# Patient Record
Sex: FEMALE | Race: WHITE | NOT HISPANIC OR LATINO | Employment: UNEMPLOYED | URBAN - NONMETROPOLITAN AREA
[De-identification: names, ages, dates, MRNs, and addresses within clinical notes are randomized per-mention and may not be internally consistent; named-entity substitution may affect disease eponyms.]

---

## 2020-06-09 ENCOUNTER — HISTORICAL (OUTPATIENT)
Dept: ADMINISTRATIVE | Facility: HOSPITAL | Age: 60
End: 2020-06-09

## 2021-08-19 DIAGNOSIS — Z12.31 SCREENING MAMMOGRAM, ENCOUNTER FOR: ICD-10-CM

## 2021-08-19 DIAGNOSIS — M81.0 OSTEOPOROSIS: Primary | ICD-10-CM

## 2021-11-22 ENCOUNTER — HOSPITAL ENCOUNTER (OUTPATIENT)
Dept: RADIOLOGY | Facility: HOSPITAL | Age: 61
Discharge: HOME | End: 2021-11-22
Attending: FAMILY MEDICINE
Payer: COMMERCIAL

## 2021-11-22 VITALS — BODY MASS INDEX: 22.09 KG/M2 | WEIGHT: 117 LBS | HEIGHT: 61 IN

## 2021-11-22 DIAGNOSIS — Z12.31 SCREENING MAMMOGRAM, ENCOUNTER FOR: ICD-10-CM

## 2021-11-22 DIAGNOSIS — M81.0 OSTEOPOROSIS: ICD-10-CM

## 2021-11-22 PROCEDURE — 77080 DXA BONE DENSITY AXIAL: CPT | Mod: TC

## 2021-11-22 PROCEDURE — 77067 SCR MAMMO BI INCL CAD: CPT | Mod: TC

## 2022-11-23 ENCOUNTER — HOSPITAL ENCOUNTER (OUTPATIENT)
Dept: RADIOLOGY | Facility: HOSPITAL | Age: 62
Discharge: HOME | End: 2022-11-23
Attending: NURSE PRACTITIONER
Payer: COMMERCIAL

## 2022-11-23 VITALS — BODY MASS INDEX: 22.09 KG/M2 | HEIGHT: 61 IN | WEIGHT: 117 LBS

## 2022-11-23 DIAGNOSIS — Z12.31 SCREENING MAMMOGRAM FOR BREAST CANCER: ICD-10-CM

## 2022-11-23 PROCEDURE — 77063 BREAST TOMOSYNTHESIS BI: CPT | Mod: TC

## 2022-12-29 ENCOUNTER — HOSPITAL ENCOUNTER (OUTPATIENT)
Dept: PREADMISSION TESTING | Facility: HOSPITAL | Age: 62
Discharge: HOME | End: 2022-12-29
Attending: SURGERY
Payer: COMMERCIAL

## 2022-12-29 ENCOUNTER — HOSPITAL ENCOUNTER (OUTPATIENT)
Dept: PULMONOLOGY | Facility: HOSPITAL | Age: 62
Discharge: HOME | End: 2022-12-29
Attending: SURGERY
Payer: COMMERCIAL

## 2022-12-29 ENCOUNTER — ANESTHESIA EVENT (OUTPATIENT)
Dept: ENDOSCOPY | Facility: HOSPITAL | Age: 62
End: 2022-12-29
Payer: COMMERCIAL

## 2022-12-29 VITALS — BODY MASS INDEX: 21.52 KG/M2 | HEIGHT: 61 IN | WEIGHT: 114 LBS

## 2022-12-29 DIAGNOSIS — Z12.11 SPECIAL SCREENING FOR MALIGNANT NEOPLASMS, COLON: ICD-10-CM

## 2022-12-29 DIAGNOSIS — Z12.11 SPECIAL SCREENING FOR MALIGNANT NEOPLASMS, COLON: Primary | ICD-10-CM

## 2022-12-29 RX ORDER — AMLODIPINE BESYLATE 5 MG/1
5 TABLET ORAL
COMMUNITY
Start: 2022-10-02

## 2022-12-29 RX ORDER — HYDROCHLOROTHIAZIDE 25 MG/1
TABLET ORAL
COMMUNITY
Start: 2022-10-30

## 2022-12-29 RX ORDER — SODIUM CHLORIDE 0.9 % (FLUSH) 0.9 %
10 SYRINGE (ML) INJECTION
Status: CANCELLED | OUTPATIENT
Start: 2022-12-29

## 2022-12-29 RX ORDER — SODIUM CHLORIDE 9 MG/ML
INJECTION, SOLUTION INTRAVENOUS CONTINUOUS
Status: CANCELLED | OUTPATIENT
Start: 2022-12-29

## 2022-12-29 NOTE — ANESTHESIA PREPROCEDURE EVALUATION
12/29/2022  Jaki Moreland is a 62 y.o., female.      Pre-op Assessment    I have reviewed the Patient Summary Reports.    I have reviewed the NPO Status.   I have reviewed the Medications.     Review of Systems  Anesthesia Hx:  No problems with previous Anesthesia  Denies Family Hx of Anesthesia complications.   Denies Personal Hx of Anesthesia complications.   Social:  Smoker    Cardiovascular:   Hypertension, well controlled    Pulmonary:  Pulmonary Normal    Renal/:  Renal/ Normal     Hepatic/GI:  Hepatic/GI Normal    Neurological:  Neurology Normal scoliosis   Endocrine:  Endocrine Normal    Psych:   anxiety        Lab Results   Component Value Date    WBC 6.35 12/29/2022    HGB 14.1 12/29/2022    HCT 39.8 12/29/2022    MCV 91 12/29/2022     12/29/2022     CMP  No results found for: NA, K, CL, CO2, GLU, BUN, CREATININE, CALCIUM, PROT, ALBUMIN, BILITOT, ALKPHOS, AST, ALT, ANIONGAP, EGFRNORACEVR    Physical Exam  General: Well nourished    Airway:  Mallampati: II / II  Mouth Opening: Normal  TM Distance: Normal  Tongue: Normal  Neck ROM: Normal ROM    Dental:  Intact    Chest/Lungs:  Clear to auscultation    Heart:  Rate: Normal  Rhythm: Regular Rhythm  Sounds: Normal      Lab Results   Component Value Date    WBC 6.35 12/29/2022    HGB 14.1 12/29/2022    HCT 39.8 12/29/2022    MCV 91 12/29/2022     12/29/2022     CMP  Sodium   Date Value Ref Range Status   12/29/2022 134 (L) 136 - 145 mmol/L Final     Potassium   Date Value Ref Range Status   12/29/2022 3.6 3.5 - 5.1 mmol/L Final     Chloride   Date Value Ref Range Status   12/29/2022 95 95 - 110 mmol/L Final     CO2   Date Value Ref Range Status   12/29/2022 34 (H) 23 - 29 mmol/L Final     Glucose   Date Value Ref Range Status   12/29/2022 121 (H) 70 - 110 mg/dL Final     BUN   Date Value Ref Range Status   12/29/2022 12 8 - 23 mg/dL  Final     Creatinine   Date Value Ref Range Status   12/29/2022 0.8 0.5 - 1.4 mg/dL Final     Calcium   Date Value Ref Range Status   12/29/2022 9.6 8.7 - 10.5 mg/dL Final     Total Protein   Date Value Ref Range Status   12/29/2022 8.0 6.0 - 8.4 g/dL Final     Albumin   Date Value Ref Range Status   12/29/2022 4.5 3.5 - 5.2 g/dL Final     Total Bilirubin   Date Value Ref Range Status   12/29/2022 0.6 0.1 - 1.0 mg/dL Final     Comment:     For infants and newborns, interpretation of results should be based  on gestational age, weight and in agreement with clinical  observations.    Premature Infant recommended reference ranges:  Up to 24 hours.............<8.0 mg/dL  Up to 48 hours............<12.0 mg/dL  3-5 days..................<15.0 mg/dL  6-29 days.................<15.0 mg/dL    For patients on Eltrombopag therapy, use of Dimension [place] TBIL is   not   recommended.       Alkaline Phosphatase   Date Value Ref Range Status   12/29/2022 60 55 - 135 U/L Final     AST   Date Value Ref Range Status   12/29/2022 19 10 - 40 U/L Final     ALT   Date Value Ref Range Status   12/29/2022 22 10 - 44 U/L Final     Anion Gap   Date Value Ref Range Status   12/29/2022 5 (L) 8 - 16 mmol/L Final     eGFR   Date Value Ref Range Status   12/29/2022 >60.0 >60 mL/min/1.73 m^2 Final       Anesthesia Plan  Type of Anesthesia, risks & benefits discussed:    Anesthesia Type: MAC  Intra-op Monitoring Plan: Standard ASA Monitors  Post Op Pain Control Plan: multimodal analgesia  Induction:  IV  Airway Plan: Direct  Informed Consent: Informed consent signed with the Patient and all parties understand the risks and agree with anesthesia plan.  All questions answered.   ASA Score: 2  Day of Surgery Review of History & Physical: I have interviewed and examined the patient. I have reviewed the patient's H&P dated: There are no significant changes.     Ready For Surgery From Anesthesia Perspective.     .

## 2023-01-03 ENCOUNTER — HOSPITAL ENCOUNTER (OUTPATIENT)
Facility: HOSPITAL | Age: 63
Discharge: HOME | End: 2023-01-03
Attending: SURGERY | Admitting: SURGERY
Payer: COMMERCIAL

## 2023-01-03 ENCOUNTER — ANESTHESIA (OUTPATIENT)
Dept: ENDOSCOPY | Facility: HOSPITAL | Age: 63
End: 2023-01-03
Payer: COMMERCIAL

## 2023-01-03 VITALS
DIASTOLIC BLOOD PRESSURE: 70 MMHG | SYSTOLIC BLOOD PRESSURE: 152 MMHG | TEMPERATURE: 97 F | RESPIRATION RATE: 20 BRPM | OXYGEN SATURATION: 99 % | HEART RATE: 63 BPM

## 2023-01-03 DIAGNOSIS — K63.5 POLYP OF COLON, UNSPECIFIED PART OF COLON, UNSPECIFIED TYPE: ICD-10-CM

## 2023-01-03 DIAGNOSIS — Z12.11 SPECIAL SCREENING FOR MALIGNANT NEOPLASMS, COLON: Primary | ICD-10-CM

## 2023-01-03 PROCEDURE — 37000008 HC ANESTHESIA 1ST 15 MINUTES: Performed by: SURGERY

## 2023-01-03 PROCEDURE — 45385 COLONOSCOPY W/LESION REMOVAL: CPT | Mod: PT | Performed by: SURGERY

## 2023-01-03 PROCEDURE — 27201423 OPTIME MED/SURG SUP & DEVICES STERILE SUPPLY: Performed by: SURGERY

## 2023-01-03 PROCEDURE — 25000003 PHARM REV CODE 250: Performed by: SURGERY

## 2023-01-03 PROCEDURE — 37000009 HC ANESTHESIA EA ADD 15 MINS: Performed by: SURGERY

## 2023-01-03 RX ORDER — SODIUM CHLORIDE 9 MG/ML
INJECTION, SOLUTION INTRAVENOUS CONTINUOUS
Status: DISCONTINUED | OUTPATIENT
Start: 2023-01-03 | End: 2023-01-03 | Stop reason: HOSPADM

## 2023-01-03 RX ORDER — PROPOFOL 10 MG/ML
VIAL (ML) INTRAVENOUS
Status: DISCONTINUED | OUTPATIENT
Start: 2023-01-03 | End: 2023-01-03

## 2023-01-03 RX ORDER — SODIUM CHLORIDE 0.9 % (FLUSH) 0.9 %
10 SYRINGE (ML) INJECTION
Status: DISCONTINUED | OUTPATIENT
Start: 2023-01-03 | End: 2023-01-03 | Stop reason: HOSPADM

## 2023-01-03 RX ADMIN — Medication 50 MG: at 12:01

## 2023-01-03 RX ADMIN — SODIUM CHLORIDE: 9 INJECTION, SOLUTION INTRAVENOUS at 10:01

## 2023-01-03 RX ADMIN — Medication 100 MG: at 12:01

## 2023-01-03 NOTE — ANESTHESIA POSTPROCEDURE EVALUATION
Anesthesia Post Evaluation    Patient: Jaki Moreland    Procedure(s) Performed: Procedure(s) (LRB):  COLONOSCOPY, WITH POLYPECTOMY USING SNARE (N/A)    Final Anesthesia Type: MAC      Patient location during evaluation: OPS  Patient participation: Yes- Able to Participate  Level of consciousness: awake  Post-procedure vital signs: reviewed and stable  Pain management: adequate  Airway patency: patent    PONV status at discharge: No PONV  Anesthetic complications: no      Cardiovascular status: blood pressure returned to baseline  Respiratory status: spontaneous ventilation  Hydration status: euvolemic  Follow-up not needed.          Vitals Value Taken Time   /70 01/03/23 1245   Temp 36.3 °C (97.4 °F) 01/03/23 1245   Pulse 63 01/03/23 1245   Resp 20 01/03/23 1245   SpO2 99 % 01/03/23 1245         No case tracking events are documented in the log.      Pain/Abbie Score: Abbei Score: 10 (1/3/2023 12:45 PM)

## 2023-01-03 NOTE — TRANSFER OF CARE
Anesthesia Transfer of Care Note    Patient: Jaki Moreland    Procedure(s) Performed: Procedure(s) (LRB):  COLONOSCOPY, WITH POLYPECTOMY USING SNARE (N/A)    Patient location: GI    Anesthesia Type: MAC    Transport from OR: Transported from OR on room air with adequate spontaneous ventilation    Post pain: adequate analgesia    Post assessment: no apparent anesthetic complications    Post vital signs: stable    Level of consciousness: awake    Nausea/Vomiting: no nausea/vomiting    Complications: none    Transfer of care protocol was followed      Last vitals:   Visit Vitals  BP (!) 141/77 (BP Location: Right arm, Patient Position: Lying)   Pulse 60   Temp 36.5 °C (97.7 °F) (Oral)   Resp 20   SpO2 100%   Breastfeeding No

## 2023-01-03 NOTE — DISCHARGE SUMMARY
Discharge Summary  General Surgery      Admit Date: 1/3/2023    Discharge Date :1/3/2023    Attending Physician: Palak Raya     Discharge Physician: Palak Raya    Discharged Condition: good    Discharge Diagnosis: Special screening for malignant neoplasms, colon [Z12.11]  Colon polyps    Treatments/Procedures: Procedure(s) (LRB):  COLONOSCOPY, WITH POLYPECTOMY USING SNARE (N/A)    Hospital Course: Uneventful; Discharged home from Recovery    Significant Diagnostic Studies: none    Disposition: Home or Self Care    Diet:  Low residue    Follow up: Office 10-14 days    Activity: No restrictions.    Patient Instructions:   Current Discharge Medication List        CONTINUE these medications which have NOT CHANGED    Details   amLODIPine (NORVASC) 5 MG tablet Take 5 mg by mouth.      hydroCHLOROthiazide (HYDRODIURIL) 25 MG tablet TAKE 1 TABLET BY MOUTH EVERY DAY IN THE MORNING FOR 90 DAYS             Discharge Procedure Orders   Diet general   Order Comments: Low residue until follow-up     Call MD for:  temperature >100.4     Call MD for:  persistent nausea and vomiting     Call MD for:  difficulty breathing, headache or visual disturbances     Call MD for:  persistent dizziness or light-headedness     Call MD for:  extreme fatigue     Call MD for:  severe uncontrolled pain     Activity as tolerated

## 2023-01-03 NOTE — DISCHARGE INSTRUCTIONS
FOLLOW UP WITH DR NINA AS SCHEDULED.IN 10-14 DAYS    REST TODAY RESUME ACTIVITY AS TOLERATED TOMORROW.  LOW RESIDUE DIET    RESUME HOME MEDICATIONS.    NO DRIVING OR DRINKING ALCOHOL FOR 24 HOURS.    CALL DR NINA'S OFFICE FOR ANY QUESTIONS OR CONCERNS.  REPORT TO THE ER IF URGENT.    THANK YOU FOR CHOOSING [facility]!

## 2023-01-03 NOTE — OP NOTE
[place] - Endoscopy  Colonoscopy Procedure  Operative Note    SUMMARY     Date of Procedure: 1/3/2023     Procedure: Procedure(s) (LRB):  COLONOSCOPY, WITH POLYPECTOMY USING SNARE (N/A)    Surgeon(s) and Role:     * Palak Raya MD - Primary    Assisting Surgeon: None     Patient location: GI    Pre-Operative Diagnosis: Special screening for malignant neoplasms, colon [Z12.11]    Post-Operative Diagnosis: Post-Op Diagnosis Codes:     * Special screening for malignant neoplasms, colon [Z12.11]  Colon polyps     Indications:  Screening age for colon cancer          Procedure:                  The patient was brought in to the endoscopy suite where the risks, benefits, and alternatives of the procedure were described.  The patient was given the opportunity to ask questions and then signed informed consent.  Patient was positioned in the left lateral decubitus position, continuous monitoring was initiated, and supplemental oxygen was provided via nasal cannula.  Adequate sedation was achieved with the above mentioned medications and then titrated during the entire procedure.  Digital rectal exam was performed.  Under direct visualization the colonoscope was introduced through the anus in to the rectum.  The scope was then advanced to the cecum, which was identified by the ileocecal valve and appendiceal orifice.  Scope was then withdrawn and the mucosa was carefully examined in a circular fashion.  The entire colonic mucosa was examined, including the rectum with retroflexion.  Air was evacuated from the colon and the procedure was terminated.  The patient tolerated the procedure well and was able to be transferred to the recovery area in stable condition.    Findings:                 Digital rectal examination:  No palpable abnormality or significant hemorrhoidal disease                    Rectum:  No mucosal abnormalities                    Sigmoid:  No mucosal abnormality        Descending:  No mucosal  abnormalities         Transverse:  No mucosal abnormalities         Ascendin small sessile polyps just distal to the ileocecal valve that were taken entirely with the hot snare retrieved in a suction retrieval trap.        Cecum:  No mucosal abnormalities.  Appendiceal orifice and ileocecal valve appreciated.        Terminal Ileum:  Not intubated     Specimens:   Specimen (24h ago, onward)       Start     Ordered    23 1226  Specimen to Pathology, Surgery Gastrointestinal tract  Once        Comments: Pre-op Diagnosis: Special screening for malignant neoplasms, colon [Z12.11]Procedure(s):COLONOSCOPY Number of specimens: 1Name of specimens: 1) polyps ascending colon @ 1220     References:    Click here for ordering Quick Tip   Question Answer Comment   Procedure Type: Gastrointestinal tract    Specimen Class: Routine/Screening    Which provider would you like to cc? TIM RAYA    Release to patient Immediate        23 1227                      Estimated Blood Loss (EBL): * No values recorded between 1/3/2023 11:58 AM and 1/3/2023 12:31 PM *     Complications: No     Diagnostic Impression:  Colon polyps     Recommendations: Discharge patient to home. Patient has a contact number available for emergencies. The signs and symptoms of potential delayed complications were discussed with the patient. Return to normal activities tomorrow. Written discharge instructions were provided to the patient. Resume previous diet. Continue present medications. Await pathology results.    Disposition:  Recovery unit stable     Attestation: I performed the procedure.        Follow Up:             No future appointments.        Tim Raya MD  1/3/2023

## 2023-01-05 LAB — SPECIMEN TO PATHOLOGY - SURGICAL: NORMAL

## 2023-07-11 ENCOUNTER — HOSPITAL ENCOUNTER (OUTPATIENT)
Dept: RADIOLOGY | Facility: HOSPITAL | Age: 63
Discharge: HOME | End: 2023-07-11
Attending: NURSE PRACTITIONER
Payer: COMMERCIAL

## 2023-07-11 VITALS — WEIGHT: 110 LBS | HEIGHT: 61 IN | BODY MASS INDEX: 20.77 KG/M2

## 2023-07-11 DIAGNOSIS — N64.53 NIPPLE RETRACTION: ICD-10-CM

## 2023-07-11 DIAGNOSIS — N64.53 RETRACTED NIPPLE: ICD-10-CM

## 2023-07-11 PROCEDURE — 77061 BREAST TOMOSYNTHESIS UNI: CPT | Mod: TC,RT

## 2023-07-11 PROCEDURE — 76642 ULTRASOUND BREAST LIMITED: CPT | Mod: TC,RT

## 2024-01-31 ENCOUNTER — LAB VISIT (OUTPATIENT)
Dept: LAB | Facility: HOSPITAL | Age: 64
End: 2024-01-31
Attending: PHYSICIAN ASSISTANT
Payer: COMMERCIAL

## 2024-01-31 DIAGNOSIS — R10.9 STOMACH ACHE: Primary | ICD-10-CM

## 2024-01-31 LAB
ALBUMIN SERPL BCP-MCNC: 4.8 G/DL (ref 3.5–5.2)
ALP SERPL-CCNC: 56 U/L (ref 55–135)
ALT SERPL W/O P-5'-P-CCNC: 22 U/L (ref 10–44)
ANION GAP SERPL CALC-SCNC: 7 MMOL/L (ref 3–11)
AST SERPL-CCNC: 18 U/L (ref 10–40)
BASOPHILS # BLD AUTO: 0.05 K/UL (ref 0–0.2)
BASOPHILS NFR BLD: 0.8 % (ref 0–1.9)
BILIRUB SERPL-MCNC: 0.8 MG/DL (ref 0.1–1)
BUN SERPL-MCNC: 12 MG/DL (ref 8–23)
CALCIUM SERPL-MCNC: 9.6 MG/DL (ref 8.7–10.5)
CHLORIDE SERPL-SCNC: 93 MMOL/L (ref 95–110)
CO2 SERPL-SCNC: 30 MMOL/L (ref 23–29)
CREAT SERPL-MCNC: 0.6 MG/DL (ref 0.5–1.4)
DIFFERENTIAL METHOD BLD: ABNORMAL
EOSINOPHIL # BLD AUTO: 0 K/UL (ref 0–0.5)
EOSINOPHIL NFR BLD: 0.6 % (ref 0–8)
ERYTHROCYTE [DISTWIDTH] IN BLOOD BY AUTOMATED COUNT: 12 % (ref 11.5–14.5)
EST. GFR  (NO RACE VARIABLE): >60 ML/MIN/1.73 M^2
GLUCOSE SERPL-MCNC: 98 MG/DL (ref 70–110)
HCT VFR BLD AUTO: 44 % (ref 37–48.5)
HGB BLD-MCNC: 15.5 G/DL (ref 12–16)
IMM GRANULOCYTES # BLD AUTO: 0.01 K/UL (ref 0–0.04)
IMM GRANULOCYTES NFR BLD AUTO: 0.2 % (ref 0–0.5)
LYMPHOCYTES # BLD AUTO: 2 K/UL (ref 1–4.8)
LYMPHOCYTES NFR BLD: 30.3 % (ref 18–48)
MCH RBC QN AUTO: 32.3 PG (ref 27–31)
MCHC RBC AUTO-ENTMCNC: 35.2 G/DL (ref 32–36)
MCV RBC AUTO: 92 FL (ref 82–98)
MONOCYTES # BLD AUTO: 0.8 K/UL (ref 0.3–1)
MONOCYTES NFR BLD: 12.1 % (ref 4–15)
NEUTROPHILS # BLD AUTO: 3.7 K/UL (ref 1.8–7.7)
NEUTROPHILS NFR BLD: 56 % (ref 38–73)
NRBC BLD-RTO: 0 /100 WBC
PLATELET # BLD AUTO: 325 K/UL (ref 150–450)
PMV BLD AUTO: 8.8 FL (ref 9.2–12.9)
POTASSIUM SERPL-SCNC: 3.5 MMOL/L (ref 3.5–5.1)
PROT SERPL-MCNC: 8.4 G/DL (ref 6–8.4)
RBC # BLD AUTO: 4.8 M/UL (ref 4–5.4)
SODIUM SERPL-SCNC: 130 MMOL/L (ref 136–145)
WBC # BLD AUTO: 6.51 K/UL (ref 3.9–12.7)

## 2024-01-31 PROCEDURE — 80053 COMPREHEN METABOLIC PANEL: CPT | Performed by: PHYSICIAN ASSISTANT

## 2024-01-31 PROCEDURE — 36415 COLL VENOUS BLD VENIPUNCTURE: CPT | Performed by: PHYSICIAN ASSISTANT

## 2024-01-31 PROCEDURE — 85025 COMPLETE CBC W/AUTO DIFF WBC: CPT | Performed by: PHYSICIAN ASSISTANT

## 2024-02-22 ENCOUNTER — OFFICE VISIT (OUTPATIENT)
Dept: PRIMARY CARE CLINIC | Facility: CLINIC | Age: 64
End: 2024-02-22
Payer: COMMERCIAL

## 2024-02-22 ENCOUNTER — LAB VISIT (OUTPATIENT)
Dept: LAB | Facility: HOSPITAL | Age: 64
End: 2024-02-22
Attending: STUDENT IN AN ORGANIZED HEALTH CARE EDUCATION/TRAINING PROGRAM
Payer: COMMERCIAL

## 2024-02-22 VITALS
DIASTOLIC BLOOD PRESSURE: 64 MMHG | RESPIRATION RATE: 16 BRPM | SYSTOLIC BLOOD PRESSURE: 130 MMHG | BODY MASS INDEX: 20.77 KG/M2 | HEART RATE: 72 BPM | TEMPERATURE: 98 F | WEIGHT: 110 LBS | OXYGEN SATURATION: 99 % | HEIGHT: 61 IN

## 2024-02-22 DIAGNOSIS — Z13.220 ENCOUNTER FOR LIPID SCREENING FOR CARDIOVASCULAR DISEASE: ICD-10-CM

## 2024-02-22 DIAGNOSIS — Z79.899 MEDICAL MARIJUANA USE: ICD-10-CM

## 2024-02-22 DIAGNOSIS — I10 HYPERTENSION, UNSPECIFIED TYPE: ICD-10-CM

## 2024-02-22 DIAGNOSIS — F41.1 GENERALIZED ANXIETY DISORDER: ICD-10-CM

## 2024-02-22 DIAGNOSIS — R63.4 WEIGHT LOSS, NON-INTENTIONAL: ICD-10-CM

## 2024-02-22 DIAGNOSIS — Z13.6 ENCOUNTER FOR LIPID SCREENING FOR CARDIOVASCULAR DISEASE: ICD-10-CM

## 2024-02-22 DIAGNOSIS — G89.29 CHRONIC MIDLINE LOW BACK PAIN WITHOUT SCIATICA: ICD-10-CM

## 2024-02-22 DIAGNOSIS — M41.56 SCOLIOSIS OF LUMBAR REGION DUE TO DEGENERATIVE DISEASE OF SPINE IN ADULT: ICD-10-CM

## 2024-02-22 DIAGNOSIS — M54.50 CHRONIC MIDLINE LOW BACK PAIN WITHOUT SCIATICA: ICD-10-CM

## 2024-02-22 DIAGNOSIS — Z00.00 ENCOUNTER FOR MEDICAL EXAMINATION TO ESTABLISH CARE: ICD-10-CM

## 2024-02-22 DIAGNOSIS — Z11.59 ENCOUNTER FOR HEPATITIS C SCREENING TEST FOR LOW RISK PATIENT: ICD-10-CM

## 2024-02-22 DIAGNOSIS — E87.1 HYPONATREMIA: ICD-10-CM

## 2024-02-22 DIAGNOSIS — F41.1 GENERALIZED ANXIETY DISORDER: Primary | ICD-10-CM

## 2024-02-22 DIAGNOSIS — Z13.31 POSITIVE DEPRESSION SCREENING: ICD-10-CM

## 2024-02-22 PROBLEM — M41.9 SCOLIOSIS: Status: ACTIVE | Noted: 2024-02-22

## 2024-02-22 PROBLEM — Z23 INFLUENZA VACCINE ADMINISTERED: Status: ACTIVE | Noted: 2024-02-22

## 2024-02-22 PROBLEM — F10.20 ETOH DEPENDENCE: Status: ACTIVE | Noted: 2024-02-22

## 2024-02-22 LAB
ALBUMIN SERPL BCP-MCNC: 4.7 G/DL (ref 3.5–5.2)
ALP SERPL-CCNC: 48 U/L (ref 55–135)
ALT SERPL W/O P-5'-P-CCNC: 19 U/L (ref 10–44)
ANION GAP SERPL CALC-SCNC: 10 MMOL/L (ref 3–11)
AST SERPL-CCNC: 19 U/L (ref 10–40)
BILIRUB SERPL-MCNC: 0.6 MG/DL (ref 0.1–1)
BUN SERPL-MCNC: 11 MG/DL (ref 8–23)
CALCIUM SERPL-MCNC: 9.7 MG/DL (ref 8.7–10.5)
CHLORIDE SERPL-SCNC: 95 MMOL/L (ref 95–110)
CHOLEST SERPL-MCNC: 209 MG/DL (ref 120–199)
CHOLEST/HDLC SERPL: 1.5 {RATIO} (ref 2–5)
CO2 SERPL-SCNC: 31 MMOL/L (ref 23–29)
CREAT SERPL-MCNC: 0.6 MG/DL (ref 0.5–1.4)
EST. GFR  (NO RACE VARIABLE): >60 ML/MIN/1.73 M^2
ESTIMATED AVG GLUCOSE: 91 MG/DL (ref 68–131)
GLUCOSE SERPL-MCNC: 108 MG/DL (ref 70–110)
HBA1C MFR BLD: 4.8 % (ref 4–5.6)
HDLC SERPL-MCNC: 136 MG/DL (ref 40–75)
HDLC SERPL: 65.1 % (ref 20–50)
LDLC SERPL CALC-MCNC: 62.8 MG/DL (ref 63–159)
NONHDLC SERPL-MCNC: 73 MG/DL
POTASSIUM SERPL-SCNC: 3.9 MMOL/L (ref 3.5–5.1)
PROT SERPL-MCNC: 8.2 G/DL (ref 6–8.4)
SODIUM SERPL-SCNC: 136 MMOL/L (ref 136–145)
TRIGL SERPL-MCNC: 51 MG/DL (ref 30–150)
TSH SERPL DL<=0.005 MIU/L-ACNC: 2.89 UIU/ML (ref 0.4–4)

## 2024-02-22 PROCEDURE — 86803 HEPATITIS C AB TEST: CPT | Performed by: STUDENT IN AN ORGANIZED HEALTH CARE EDUCATION/TRAINING PROGRAM

## 2024-02-22 PROCEDURE — 80053 COMPREHEN METABOLIC PANEL: CPT | Performed by: STUDENT IN AN ORGANIZED HEALTH CARE EDUCATION/TRAINING PROGRAM

## 2024-02-22 PROCEDURE — 82306 VITAMIN D 25 HYDROXY: CPT | Performed by: STUDENT IN AN ORGANIZED HEALTH CARE EDUCATION/TRAINING PROGRAM

## 2024-02-22 PROCEDURE — 36415 COLL VENOUS BLD VENIPUNCTURE: CPT | Performed by: STUDENT IN AN ORGANIZED HEALTH CARE EDUCATION/TRAINING PROGRAM

## 2024-02-22 PROCEDURE — 3044F HG A1C LEVEL LT 7.0%: CPT | Mod: CPTII,S$GLB,, | Performed by: STUDENT IN AN ORGANIZED HEALTH CARE EDUCATION/TRAINING PROGRAM

## 2024-02-22 PROCEDURE — 99204 OFFICE O/P NEW MOD 45 MIN: CPT | Mod: S$GLB,,, | Performed by: STUDENT IN AN ORGANIZED HEALTH CARE EDUCATION/TRAINING PROGRAM

## 2024-02-22 PROCEDURE — 3008F BODY MASS INDEX DOCD: CPT | Mod: CPTII,S$GLB,, | Performed by: STUDENT IN AN ORGANIZED HEALTH CARE EDUCATION/TRAINING PROGRAM

## 2024-02-22 PROCEDURE — 84443 ASSAY THYROID STIM HORMONE: CPT | Performed by: STUDENT IN AN ORGANIZED HEALTH CARE EDUCATION/TRAINING PROGRAM

## 2024-02-22 PROCEDURE — 80061 LIPID PANEL: CPT | Performed by: STUDENT IN AN ORGANIZED HEALTH CARE EDUCATION/TRAINING PROGRAM

## 2024-02-22 PROCEDURE — 1159F MED LIST DOCD IN RCRD: CPT | Mod: CPTII,S$GLB,, | Performed by: STUDENT IN AN ORGANIZED HEALTH CARE EDUCATION/TRAINING PROGRAM

## 2024-02-22 PROCEDURE — 83036 HEMOGLOBIN GLYCOSYLATED A1C: CPT | Performed by: STUDENT IN AN ORGANIZED HEALTH CARE EDUCATION/TRAINING PROGRAM

## 2024-02-22 PROCEDURE — 3078F DIAST BP <80 MM HG: CPT | Mod: CPTII,S$GLB,, | Performed by: STUDENT IN AN ORGANIZED HEALTH CARE EDUCATION/TRAINING PROGRAM

## 2024-02-22 PROCEDURE — 99999 PR PBB SHADOW E&M-EST. PATIENT-LVL V: CPT | Mod: PBBFAC,,, | Performed by: STUDENT IN AN ORGANIZED HEALTH CARE EDUCATION/TRAINING PROGRAM

## 2024-02-22 PROCEDURE — 3075F SYST BP GE 130 - 139MM HG: CPT | Mod: CPTII,S$GLB,, | Performed by: STUDENT IN AN ORGANIZED HEALTH CARE EDUCATION/TRAINING PROGRAM

## 2024-02-22 NOTE — ASSESSMENT & PLAN NOTE
Denies SI/HI or thoughts of harm to self or others. Past medications include,     Counseled at length on building coping skills  - demonstrated diaphragmatic breathing and help relax muscle groups  - when mind drifts off, then focus back to breathing  - discussed mindfulness at home   - free relaxation exercises to read and listen at [email or web address]

## 2024-02-22 NOTE — ASSESSMENT & PLAN NOTE
Use now for five years which helps with anxiety. Sleeping well at bedtime at least 7 hours nightly. Currently smokes two joints without adverse side effects.

## 2024-02-22 NOTE — ASSESSMENT & PLAN NOTE
BP Readings from Last 3 Encounters:   02/22/24 130/64   01/03/23 (!) 152/70   Current regimen include, amlodipine and hydrochlorothiazide.   Lab Results   Component Value Date    CREATININE 0.6 01/31/2024    BUN 12 01/31/2024     (L) 01/31/2024    K 3.5 01/31/2024    CL 93 (L) 01/31/2024    CO2 30 (H) 01/31/2024   Most recent chemistry with hyponatremia and hypokalemia noted, like secondary to HCTZ use.   Per patient she is taking HCTZ intermittently whenever she feels.   Will discontinue HCTZ, will replace with losartan 25 mg and reduce amlodipine to 2.5 mg.   - f/u 2 weeks for BP readings

## 2024-02-22 NOTE — ASSESSMENT & PLAN NOTE
"Likely attributed to scoliosis and lumbar degenertraive changes noted on CT abdomen pelvis (2/2024), Past history of two "bad" falls. Once from viral infection, dizziness over  4-5 years ago, the other mechanical fall playing with grandkids.  Patient has seen chiropractor for adjustments. Midline pain increasing over the past several weeks.   - f/u referral spine specialist  - f/u DEXA scan  "

## 2024-02-22 NOTE — ASSESSMENT & PLAN NOTE
Wt Readings from Last 8 Encounters:   02/22/24 49.9 kg (110 lb)   07/11/23 49.9 kg (110 lb)   12/29/22 51.7 kg (114 lb)   11/23/22 53.1 kg (117 lb)   11/22/21 53.1 kg (117 lb)   Recommendations:   Stay physically active. As tolerated alternate resistance training with stretching and cardio. Goal of 150 minutes per week of moderate intensity activity or 7,500 - 10,000 steps per day. Follow the Mediterranean Diet. Include whole fresh fruits, vegetables, olive oil, seeds, nuts, whole grains, cold water fish, salmon, mackerel and lean cuts of meat.  Do not drink sugary/diet carbonated beverages. Decrease portion sizes slightly which will result in an approximately 500-calorie deficit. Avoid fast or fried and processed food, especially canned foods. Avoid refined carbohydrates, white starchy foods, flour, white potato, bread, muffins, and cakes. Consider substituting one meal a day with a meal replacement such as Slim fast, lean cuisine, or weight watcher's. Follow a healthy diet that includes enough calcium, vitamin D and proteins for bone health.

## 2024-02-23 LAB
25(OH)D3+25(OH)D2 SERPL-MCNC: 89 NG/ML (ref 30–96)
HCV AB SERPL QL IA: NORMAL

## 2024-02-23 NOTE — PROGRESS NOTES
[facility] Note    HPI:  Jaki Moreland is a 63 y.o. female who presents today for Establish Care (Patient states she has been going to urgent care a lot due to back issues. )  63 year old with scoliosis and chronic low back pain, degenerative disc disease, hypertension, hyponatremia.   Patient states she currently started taking medical marijuana, smoking two joints daily to help with anxiety and sleep.   Denies weight fluctuations.  Denies fever, chills, vision changes, chest pain, palpitations, shortness of breath, abdominal pain, nausea, vomiting, diarrhea, constipation.      ROS   A review of systems was performed and was negative except as noted above.    I personally reviewed allergies, past medical, surgical, social and family history and updated as appropriate.    Medications:    Current Outpatient Medications:     amLODIPine (NORVASC) 5 MG tablet, Take 5 mg by mouth., Disp: , Rfl:     hydroCHLOROthiazide (HYDRODIURIL) 25 MG tablet, TAKE 1 TABLET BY MOUTH EVERY DAY IN THE MORNING FOR 90 DAYS, Disp: , Rfl:      Health Maintenance:  Immunization History   Administered Date(s) Administered    COVID-19 Vaccine 08/11/2021, 09/17/2021    Influenza 10/08/2010, 09/25/2015, 01/04/2017    Tdap 09/25/2015      Health Maintenance   Topic Date Due    Shingles Vaccine (1 of 2) Never done    Mammogram  07/11/2024    TETANUS VACCINE  09/25/2025    Lipid Panel  02/22/2029    Colorectal Cancer Screening  01/03/2033    Hepatitis C Screening  Completed     Health Maintenance Topics with due status: Not Due       Topic Last Completion Date    TETANUS VACCINE 09/25/2015    Colorectal Cancer Screening 01/03/2023    Mammogram 07/11/2023    Lipid Panel 02/22/2024     Health Maintenance Due   Topic Date Due    Cervical Cancer Screening  Never done    Pneumococcal Vaccines (Age 0-64) (1 of 2 - PCV) Never done    Shingles Vaccine (1 of 2) Never done    RSV Vaccine (Age 60+ and Pregnant patients) (1 - 1-dose 60+  "series) Never done    Influenza Vaccine (1) 09/01/2023    COVID-19 Vaccine (3 - 2023-24 season) 09/01/2023       PHYSICAL EXAM:  Vitals:    02/22/24 1002   BP: 130/64   BP Location: Right arm   Patient Position: Sitting   BP Method: Medium (Automatic)   Pulse: 72   Resp: 16   Temp: 98 °F (36.7 °C)   TempSrc: Oral   SpO2: 99%   Weight: 49.9 kg (110 lb)   Height: 5' 1" (1.549 m)     Body mass index is 20.78 kg/m².  Physical Exam  Vitals reviewed.   Constitutional:       General: She is not in acute distress.     Appearance: Normal appearance. She is normal weight. She is not ill-appearing or toxic-appearing.   HENT:      Head: Normocephalic and atraumatic.      Right Ear: External ear normal.      Left Ear: External ear normal.      Nose: Nose normal.      Mouth/Throat:      Mouth: Mucous membranes are moist.      Pharynx: Oropharynx is clear.   Eyes:      Extraocular Movements: Extraocular movements intact.      Conjunctiva/sclera: Conjunctivae normal.   Cardiovascular:      Rate and Rhythm: Normal rate and regular rhythm.      Pulses: Normal pulses.      Heart sounds: Normal heart sounds.   Pulmonary:      Effort: Pulmonary effort is normal. No respiratory distress.      Breath sounds: No stridor. No wheezing, rhonchi or rales.   Abdominal:      General: Abdomen is flat. There is no distension.      Palpations: Abdomen is soft.      Tenderness: There is no abdominal tenderness. There is no right CVA tenderness, left CVA tenderness or guarding.   Musculoskeletal:         General: No tenderness. Normal range of motion.      Cervical back: Normal range of motion and neck supple. No rigidity or tenderness.   Skin:     General: Skin is warm and dry.      Capillary Refill: Capillary refill takes less than 2 seconds.   Neurological:      General: No focal deficit present.      Mental Status: She is alert and oriented to person, place, and time. Mental status is at baseline.      Motor: No weakness.      Gait: Gait normal. "   Psychiatric:         Mood and Affect: Mood normal.         Behavior: Behavior normal.         Thought Content: Thought content normal.         Judgment: Judgment normal.        ASSESSMENT/PLAN:  1. Generalized anxiety disorder  Assessment & Plan:  Denies SI/HI or thoughts of harm to self or others. Past medications include,     Counseled at length on building coping skills  - demonstrated diaphragmatic breathing and help relax muscle groups  - when mind drifts off, then focus back to breathing  - discussed mindfulness at home   - free relaxation exercises to read and listen at [email or web address]      Orders:  -     Cancel: Ambulatory referral/consult to Behavioral Health; Future; Expected date: 02/29/2024  -     Vitamin D; Future; Expected date: 02/22/2024  -     TSH; Future; Expected date: 02/22/2024  -     Comprehensive Metabolic Panel; Future; Expected date: 02/22/2024    2. Hypertension, unspecified type  Assessment & Plan:  BP Readings from Last 3 Encounters:   02/22/24 130/64   01/03/23 (!) 152/70   Current regimen include, amlodipine and hydrochlorothiazide.   Lab Results   Component Value Date    CREATININE 0.6 01/31/2024    BUN 12 01/31/2024     (L) 01/31/2024    K 3.5 01/31/2024    CL 93 (L) 01/31/2024    CO2 30 (H) 01/31/2024   Most recent chemistry with hyponatremia and hypokalemia noted, like secondary to HCTZ use.   Per patient she is taking HCTZ intermittently whenever she feels.   Will discontinue HCTZ, will replace with losartan 25 mg and reduce amlodipine to 2.5 mg.   - f/u 2 weeks for BP readings            Orders:  -     Comprehensive Metabolic Panel; Future; Expected date: 02/22/2024    3. Hyponatremia  Assessment & Plan:  Endorses daily intake of 5-6 can of beers. Other sources of hydration with couple bottles of water.  Likely contributing factor to hyponatremia and diminished bone mineral density.   Denies symptoms.   Counseled on reducing daily intake to 2 cans daily,  "maintain adequate hydration.   - f/u BMP    Orders:  -     Comprehensive Metabolic Panel; Future; Expected date: 02/22/2024    4. Encounter for hepatitis C screening test for low risk patient  -     Hepatitis C Antibody; Future; Expected date: 02/22/2024    5. Chronic midline low back pain without sciatica  Assessment & Plan:  Likely attributed to scoliosis and lumbar degenertraive changes noted on CT abdomen pelvis (2/2024), Past history of two "bad" falls. Once from viral infection, dizziness over  4-5 years ago, the other mechanical fall playing with grandkids.  Patient has seen chiropractor for adjustments. Midline pain increasing over the past several weeks.   - f/u referral spine specialist  - f/u DEXA scan      6. Scoliosis of lumbar region due to degenerative disease of spine in adult  -     Ambulatory referral/consult to Orthopedics; Future; Expected date: 02/29/2024    7. Encounter for lipid screening for cardiovascular disease  -     Lipid Panel; Future; Expected date: 02/22/2024    8. Weight loss, non-intentional  -     Cancel: HIV 1/2 Ag/Ab (4th Gen); Future; Expected date: 02/22/2024  -     TSH; Future; Expected date: 02/22/2024  -     Hemoglobin A1C; Future; Expected date: 02/22/2024  -     Comprehensive Metabolic Panel; Future; Expected date: 02/22/2024    9. Medical marijuana use  Assessment & Plan:  Use now for five years which helps with anxiety. Sleeping well at bedtime at least 7 hours nightly. Currently smokes two joints without adverse side effects.         10. Positive depression screening  Comments:  I have reviewed the positive depression score which warrants active treatment with psychotherapy and/or medications.  Overview:  I have reviewed the positive depression score which warrants active treatment with psychotherapy and/or medications.    Orders:  -     Cancel: Ambulatory referral/consult to Behavioral Health; Future; Expected date: 02/29/2024  -     Vitamin D; Future; Expected date: " 02/22/2024  -     TSH; Future; Expected date: 02/22/2024    11. BMI 20.0-20.9, adult  Assessment & Plan:  Wt Readings from Last 8 Encounters:   02/22/24 49.9 kg (110 lb)   07/11/23 49.9 kg (110 lb)   12/29/22 51.7 kg (114 lb)   11/23/22 53.1 kg (117 lb)   11/22/21 53.1 kg (117 lb)   Recommendations:   Stay physically active. As tolerated alternate resistance training with stretching and cardio. Goal of 150 minutes per week of moderate intensity activity or 7,500 - 10,000 steps per day. Follow the Mediterranean Diet. Include whole fresh fruits, vegetables, olive oil, seeds, nuts, whole grains, cold water fish, salmon, mackerel and lean cuts of meat.  Do not drink sugary/diet carbonated beverages. Decrease portion sizes slightly which will result in an approximately 500-calorie deficit. Avoid fast or fried and processed food, especially canned foods. Avoid refined carbohydrates, white starchy foods, flour, white potato, bread, muffins, and cakes. Consider substituting one meal a day with a meal replacement such as Slim fast, lean cuisine, or weight watcher's. Follow a healthy diet that includes enough calcium, vitamin D and proteins for bone health.        12. Encounter for medical examination to establish care  -     Vitamin D; Future; Expected date: 02/22/2024    Other orders  -     Cancel: Influenza - Quadrivalent *Preferred* (6 months+) (PF)        Other than changes above, continue current medications and maintain follow up with specialists.      Follow up in about 4 weeks (around 3/21/2024) for Med recheck, Lab review.   Recent Results (from the past 2016 hour(s))   Comprehensive Metabolic Panel    Collection Time: 01/31/24 11:56 AM   Result Value Ref Range    Sodium 130 (L) 136 - 145 mmol/L    Potassium 3.5 3.5 - 5.1 mmol/L    Chloride 93 (L) 95 - 110 mmol/L    CO2 30 (H) 23 - 29 mmol/L    Glucose 98 70 - 110 mg/dL    BUN 12 8 - 23 mg/dL    Creatinine 0.6 0.5 - 1.4 mg/dL    Calcium 9.6 8.7 - 10.5 mg/dL    Total  Protein 8.4 6.0 - 8.4 g/dL    Albumin 4.8 3.5 - 5.2 g/dL    Total Bilirubin 0.8 0.1 - 1.0 mg/dL    Alkaline Phosphatase 56 55 - 135 U/L    AST 18 10 - 40 U/L    ALT 22 10 - 44 U/L    eGFR >60.0 >60 mL/min/1.73 m^2    Anion Gap 7 3 - 11 mmol/L   CBC Auto Differential    Collection Time: 01/31/24 11:56 AM   Result Value Ref Range    WBC 6.51 3.90 - 12.70 K/uL    RBC 4.80 4.00 - 5.40 M/uL    Hemoglobin 15.5 12.0 - 16.0 g/dL    Hematocrit 44.0 37.0 - 48.5 %    MCV 92 82 - 98 fL    MCH 32.3 (H) 27.0 - 31.0 pg    MCHC 35.2 32.0 - 36.0 g/dL    RDW 12.0 11.5 - 14.5 %    Platelets 325 150 - 450 K/uL    MPV 8.8 (L) 9.2 - 12.9 fL    Immature Granulocytes 0.2 0.0 - 0.5 %    Gran # (ANC) 3.7 1.8 - 7.7 K/uL    Immature Grans (Abs) 0.01 0.00 - 0.04 K/uL    Lymph # 2.0 1.0 - 4.8 K/uL    Mono # 0.8 0.3 - 1.0 K/uL    Eos # 0.0 0.0 - 0.5 K/uL    Baso # 0.05 0.00 - 0.20 K/uL    nRBC 0 0 /100 WBC    Gran % 56.0 38.0 - 73.0 %    Lymph % 30.3 18.0 - 48.0 %    Mono % 12.1 4.0 - 15.0 %    Eosinophil % 0.6 0.0 - 8.0 %    Basophil % 0.8 0.0 - 1.9 %    Differential Method Automated    Lipid Panel    Collection Time: 02/22/24 11:37 AM   Result Value Ref Range    Cholesterol 209 (H) 120 - 199 mg/dL    Triglycerides 51 30 - 150 mg/dL     (H) 40 - 75 mg/dL    LDL Cholesterol 62.8 (L) 63.0 - 159.0 mg/dL    HDL/Cholesterol Ratio 65.1 (H) 20.0 - 50.0 %    Total Cholesterol/HDL Ratio 1.5 (L) 2.0 - 5.0    Non-HDL Cholesterol 73 mg/dL   Hepatitis C Antibody    Collection Time: 02/22/24 11:37 AM   Result Value Ref Range    Hepatitis C Ab Non-reactive Non-reactive   Vitamin D    Collection Time: 02/22/24 11:37 AM   Result Value Ref Range    Vit D, 25-Hydroxy 89 30 - 96 ng/mL   TSH    Collection Time: 02/22/24 11:37 AM   Result Value Ref Range    TSH 2.890 0.400 - 4.000 uIU/mL   Hemoglobin A1C    Collection Time: 02/22/24 11:37 AM   Result Value Ref Range    Hemoglobin A1C 4.8 4.0 - 5.6 %    Estimated Avg Glucose 91 68 - 131 mg/dL   Comprehensive  Metabolic Panel    Collection Time: 02/22/24 11:37 AM   Result Value Ref Range    Sodium 136 136 - 145 mmol/L    Potassium 3.9 3.5 - 5.1 mmol/L    Chloride 95 95 - 110 mmol/L    CO2 31 (H) 23 - 29 mmol/L    Glucose 108 70 - 110 mg/dL    BUN 11 8 - 23 mg/dL    Creatinine 0.6 0.5 - 1.4 mg/dL    Calcium 9.7 8.7 - 10.5 mg/dL    Total Protein 8.2 6.0 - 8.4 g/dL    Albumin 4.7 3.5 - 5.2 g/dL    Total Bilirubin 0.6 0.1 - 1.0 mg/dL    Alkaline Phosphatase 48 (L) 55 - 135 U/L    AST 19 10 - 40 U/L    ALT 19 10 - 44 U/L    eGFR >60.0 >60 mL/min/1.73 m^2    Anion Gap 10 3 - 11 mmol/L       Sandie Pat DO  [facility]

## 2024-02-23 NOTE — ASSESSMENT & PLAN NOTE
Endorses daily intake of 5-6 can of beers. Other sources of hydration with couple bottles of water.  Likely contributing factor to hyponatremia and diminished bone mineral density.   Denies symptoms.   Counseled on reducing daily intake to 2 cans daily, maintain adequate hydration.   - f/u BMP

## 2024-03-20 ENCOUNTER — OFFICE VISIT (OUTPATIENT)
Dept: PRIMARY CARE CLINIC | Facility: CLINIC | Age: 64
End: 2024-03-20
Payer: COMMERCIAL

## 2024-03-20 VITALS
SYSTOLIC BLOOD PRESSURE: 127 MMHG | TEMPERATURE: 98 F | BODY MASS INDEX: 21.79 KG/M2 | WEIGHT: 115.38 LBS | OXYGEN SATURATION: 99 % | RESPIRATION RATE: 16 BRPM | HEART RATE: 68 BPM | DIASTOLIC BLOOD PRESSURE: 60 MMHG | HEIGHT: 61 IN

## 2024-03-20 DIAGNOSIS — E78.2 MIXED HYPERLIPIDEMIA: ICD-10-CM

## 2024-03-20 DIAGNOSIS — F41.1 GENERALIZED ANXIETY DISORDER: ICD-10-CM

## 2024-03-20 DIAGNOSIS — E87.1 HYPONATREMIA: ICD-10-CM

## 2024-03-20 DIAGNOSIS — E55.9 VITAMIN D INSUFFICIENCY: Chronic | ICD-10-CM

## 2024-03-20 DIAGNOSIS — Z13.31 POSITIVE DEPRESSION SCREENING: ICD-10-CM

## 2024-03-20 DIAGNOSIS — I10 HYPERTENSION, UNSPECIFIED TYPE: Primary | ICD-10-CM

## 2024-03-20 DIAGNOSIS — Z79.899 MEDICAL MARIJUANA USE: ICD-10-CM

## 2024-03-20 DIAGNOSIS — M41.56 SCOLIOSIS OF LUMBAR REGION DUE TO DEGENERATIVE DISEASE OF SPINE IN ADULT: ICD-10-CM

## 2024-03-20 DIAGNOSIS — F10.20 UNCOMPLICATED ALCOHOL DEPENDENCE: ICD-10-CM

## 2024-03-20 DIAGNOSIS — R63.4 WEIGHT LOSS, NON-INTENTIONAL: ICD-10-CM

## 2024-03-20 PROBLEM — Z00.00 ENCOUNTER FOR MEDICAL EXAMINATION TO ESTABLISH CARE: Status: RESOLVED | Noted: 2024-02-22 | Resolved: 2024-03-20

## 2024-03-20 PROBLEM — E78.00 HYPERCHOLESTEROLEMIA: Status: ACTIVE | Noted: 2024-02-22

## 2024-03-20 PROCEDURE — 3008F BODY MASS INDEX DOCD: CPT | Mod: CPTII,S$GLB,, | Performed by: STUDENT IN AN ORGANIZED HEALTH CARE EDUCATION/TRAINING PROGRAM

## 2024-03-20 PROCEDURE — 99999 PR PBB SHADOW E&M-EST. PATIENT-LVL IV: CPT | Mod: PBBFAC,,, | Performed by: STUDENT IN AN ORGANIZED HEALTH CARE EDUCATION/TRAINING PROGRAM

## 2024-03-20 PROCEDURE — 3044F HG A1C LEVEL LT 7.0%: CPT | Mod: CPTII,S$GLB,, | Performed by: STUDENT IN AN ORGANIZED HEALTH CARE EDUCATION/TRAINING PROGRAM

## 2024-03-20 PROCEDURE — 1159F MED LIST DOCD IN RCRD: CPT | Mod: CPTII,S$GLB,, | Performed by: STUDENT IN AN ORGANIZED HEALTH CARE EDUCATION/TRAINING PROGRAM

## 2024-03-20 PROCEDURE — 3078F DIAST BP <80 MM HG: CPT | Mod: CPTII,S$GLB,, | Performed by: STUDENT IN AN ORGANIZED HEALTH CARE EDUCATION/TRAINING PROGRAM

## 2024-03-20 PROCEDURE — 3074F SYST BP LT 130 MM HG: CPT | Mod: CPTII,S$GLB,, | Performed by: STUDENT IN AN ORGANIZED HEALTH CARE EDUCATION/TRAINING PROGRAM

## 2024-03-20 PROCEDURE — 1160F RVW MEDS BY RX/DR IN RCRD: CPT | Mod: CPTII,S$GLB,, | Performed by: STUDENT IN AN ORGANIZED HEALTH CARE EDUCATION/TRAINING PROGRAM

## 2024-03-20 PROCEDURE — 99214 OFFICE O/P EST MOD 30 MIN: CPT | Mod: S$GLB,,, | Performed by: STUDENT IN AN ORGANIZED HEALTH CARE EDUCATION/TRAINING PROGRAM

## 2024-03-20 NOTE — ASSESSMENT & PLAN NOTE
2/22/24 , TG 51, , LDL 63  Blood work reviewed. Findings significant for elevated cholesterol. All others balanced. Continue dietary modifications and incorporate daily physical exercises as tolerated.   - follow heart healthy diet, whole vegetables, whole fruits, whole meats  - avoid processed foods, cut back on highly refined carbohydrates including breads, pasta, tortillas and starchy foods like white potato  - avoid all tobacco products   - maintain regular aerobic exercises

## 2024-03-20 NOTE — ASSESSMENT & PLAN NOTE
Lab Results   Component Value Date    TSH 2.890 02/22/2024   Continue to monitor. Weight stable at this time.

## 2024-03-20 NOTE — ASSESSMENT & PLAN NOTE
Reduce consumption, watch for hyponatremia, maintain adequate daily hydration.   - continue to monitor intake

## 2024-03-20 NOTE — ASSESSMENT & PLAN NOTE
2/22/24 Vitamin D levels within normal limits. Continue with daily dietary intake. Monitor calcium and vitamin D levels in future.

## 2024-03-20 NOTE — ASSESSMENT & PLAN NOTE
Wt Readings from Last 8 Encounters:   03/20/24 52.3 kg (115 lb 6.4 oz)   02/22/24 49.9 kg (110 lb)   07/11/23 49.9 kg (110 lb)   12/29/22 51.7 kg (114 lb)   11/23/22 53.1 kg (117 lb)   11/22/21 53.1 kg (117 lb)   Recommendations:   Stay physically active. As tolerated alternate resistance training with stretching and cardio. Goal of 150 minutes per week of moderate intensity activity or 7,500 - 10,000 steps per day. Follow the Mediterranean Diet. Include whole fresh fruits, vegetables, olive oil, seeds, nuts, whole grains, cold water fish, salmon, mackerel and lean cuts of meat.  Do not drink sugary/diet carbonated beverages. Decrease portion sizes slightly which will result in an approximately 500-calorie deficit. Avoid fast or fried and processed food, especially canned foods. Avoid refined carbohydrates, white starchy foods, flour, white potato, bread, muffins, and cakes. Consider substituting one meal a day with a meal replacement such as Slim fast, lean cuisine, or weight watcher's. Follow a healthy diet that includes enough calcium, vitamin D and proteins for bone health.

## 2024-03-20 NOTE — ASSESSMENT & PLAN NOTE
Denies SI/HI or thoughts of harm to self or others. Past medications include,   Counseled at length on building coping skills  - demonstrated diaphragmatic breathing and help relax muscle groups  - when mind drifts off, then focus back to breathing  - discussed mindfulness at home   - free relaxation exercises to read and listen at [email or web address]

## 2024-03-20 NOTE — ASSESSMENT & PLAN NOTE
No worsening functional change. Patient maintaining intake daily vitamin D3, mineral nutrients to maintain bone health.   - continue with supplementation  - take daily [id number] IU vitamin D3  - incorporate into diet, vitamin D fortified foods, cereal, yogurt, fresh salmon, canned sardines, tuna and  mushrooms

## 2024-03-20 NOTE — ASSESSMENT & PLAN NOTE
Use now for five years which helps with anxiety and appetite.   Sleeping well at bedtime at least 7 hours nightly. Currently smokes one joint from two joints without adverse side effects.

## 2024-03-20 NOTE — PROGRESS NOTES
[facility] Note    HPI:  Jaki Moreland is a 63 y.o. female who presents today for Follow-up (Pt here to f/u labs)    63 year old with scoliosis and chronic low back pain, degenerative disc disease, hypertension, hyponatremia.   Patient states she currently started taking medical marijuana, smoking one joint daily to help with anxiety and sleep.  Marijuana aids in appetite.   No detrimental effects at work.   Sleeping 6-7 hours at bedtime.      Denies fever, chills, vision changes, chest pain, palpitations, shortness of breath, abdominal pain, nausea, vomiting, diarrhea, constipation.      ROS   A review of systems was performed and was negative except as noted above.    I personally reviewed allergies, past medical, surgical, social and family history and updated as appropriate.    Medications:    Current Outpatient Medications:     amLODIPine (NORVASC) 5 MG tablet, Take 5 mg by mouth., Disp: , Rfl:     hydroCHLOROthiazide (HYDRODIURIL) 25 MG tablet, TAKE 1 TABLET BY MOUTH EVERY DAY IN THE MORNING FOR 90 DAYS, Disp: , Rfl:      Health Maintenance:  Immunization History   Administered Date(s) Administered    COVID-19 Vaccine 08/11/2021, 09/17/2021    Influenza 10/08/2010, 09/25/2015, 01/04/2017    Tdap 09/25/2015      Health Maintenance   Topic Date Due    Shingles Vaccine (1 of 2) Never done    Mammogram  07/11/2024    TETANUS VACCINE  09/25/2025    Lipid Panel  02/22/2029    Colorectal Cancer Screening  01/03/2033    Hepatitis C Screening  Completed     Health Maintenance Topics with due status: Not Due       Topic Last Completion Date    TETANUS VACCINE 09/25/2015    Colorectal Cancer Screening 01/03/2023    Mammogram 07/11/2023    Lipid Panel 02/22/2024     Health Maintenance Due   Topic Date Due    Cervical Cancer Screening  Never done    Pneumococcal Vaccines (Age 0-64) (1 of 2 - PCV) Never done    Shingles Vaccine (1 of 2) Never done    RSV Vaccine (Age 60+ and Pregnant patients) (1 -  "1-dose 60+ series) Never done    Influenza Vaccine (1) 09/01/2023    COVID-19 Vaccine (3 - 2023-24 season) 09/01/2023       PHYSICAL EXAM:  Vitals:    03/20/24 0917   BP: 127/60   BP Location: Right arm   Patient Position: Sitting   BP Method: Medium (Automatic)   Pulse: 68   Resp: 16   Temp: 98.4 °F (36.9 °C)   TempSrc: Oral   SpO2: 99%   Weight: 52.3 kg (115 lb 6.4 oz)   Height: 5' 1" (1.549 m)     Body mass index is 21.8 kg/m².  Physical Exam  Vitals reviewed.   Constitutional:       General: She is not in acute distress.     Appearance: Normal appearance. She is normal weight. She is not ill-appearing or toxic-appearing.   HENT:      Head: Normocephalic and atraumatic.      Right Ear: External ear normal.      Left Ear: External ear normal.      Nose: Nose normal.      Mouth/Throat:      Mouth: Mucous membranes are moist.      Pharynx: Oropharynx is clear.   Eyes:      Extraocular Movements: Extraocular movements intact.      Conjunctiva/sclera: Conjunctivae normal.   Cardiovascular:      Rate and Rhythm: Normal rate and regular rhythm.      Pulses: Normal pulses.      Heart sounds: Normal heart sounds.   Pulmonary:      Effort: Pulmonary effort is normal. No respiratory distress.      Breath sounds: No stridor. No wheezing, rhonchi or rales.   Abdominal:      General: Abdomen is flat. There is no distension.      Palpations: Abdomen is soft.      Tenderness: There is no abdominal tenderness. There is no right CVA tenderness, left CVA tenderness or guarding.   Musculoskeletal:         General: No tenderness. Normal range of motion.      Cervical back: Normal range of motion and neck supple. No rigidity or tenderness.   Skin:     General: Skin is warm and dry.      Capillary Refill: Capillary refill takes less than 2 seconds.   Neurological:      General: No focal deficit present.      Mental Status: She is alert and oriented to person, place, and time. Mental status is at baseline.      Motor: No weakness.      " Gait: Gait normal.   Psychiatric:         Mood and Affect: Mood normal.         Behavior: Behavior normal.         Thought Content: Thought content normal.         Judgment: Judgment normal.          ASSESSMENT/PLAN:  1. Hypertension, unspecified type  Assessment & Plan:  BP Readings from Last 3 Encounters:   03/20/24 127/60   02/22/24 130/64   01/03/23 (!) 152/70   Current regimen include, amlodipine and hydrochlorothiazide.   Lab Results   Component Value Date    CREATININE 0.6 02/22/2024    BUN 11 02/22/2024     02/22/2024    K 3.9 02/22/2024    CL 95 02/22/2024    CO2 31 (H) 02/22/2024   Most recent chemistry with hyponatremia and hypokalemia noted, like secondary to HCTZ use.   Patient has not changed her antihypertensive regimen.   Will continue with current regimen, amlodipine 5 mg and HCTZ 25 mg.  - follow low-sodium diet <2 g or 2 teaspoons daily  - avoid processed and preserved foods, ie microwave meals, pickles, canned fruits  - incorporate whole vegetables and fruits and meats  - increase daily physical activity  - f/u cardiology notes               2. BMI 20.0-20.9, adult  Assessment & Plan:  Wt Readings from Last 8 Encounters:   03/20/24 52.3 kg (115 lb 6.4 oz)   02/22/24 49.9 kg (110 lb)   07/11/23 49.9 kg (110 lb)   12/29/22 51.7 kg (114 lb)   11/23/22 53.1 kg (117 lb)   11/22/21 53.1 kg (117 lb)   Recommendations:   Stay physically active. As tolerated alternate resistance training with stretching and cardio. Goal of 150 minutes per week of moderate intensity activity or 7,500 - 10,000 steps per day. Follow the Mediterranean Diet. Include whole fresh fruits, vegetables, olive oil, seeds, nuts, whole grains, cold water fish, salmon, mackerel and lean cuts of meat.  Do not drink sugary/diet carbonated beverages. Decrease portion sizes slightly which will result in an approximately 500-calorie deficit. Avoid fast or fried and processed food, especially canned foods. Avoid refined carbohydrates,  white starchy foods, flour, white potato, bread, muffins, and cakes. Consider substituting one meal a day with a meal replacement such as Slim fast, lean cuisine, or weight watcher's. Follow a healthy diet that includes enough calcium, vitamin D and proteins for bone health.        3. Hyponatremia    4. Medical marijuana use  Assessment & Plan:  Use now for five years which helps with anxiety and appetite.   Sleeping well at bedtime at least 7 hours nightly. Currently smokes one joint from two joints without adverse side effects.         5. Generalized anxiety disorder  Assessment & Plan:  Denies SI/HI or thoughts of harm to self or others. Past medications include,   Counseled at length on building coping skills  - demonstrated diaphragmatic breathing and help relax muscle groups  - when mind drifts off, then focus back to breathing  - discussed mindfulness at home   - free relaxation exercises to read and listen at http://[employer]/audio        6. Weight loss, non-intentional  Assessment & Plan:  Lab Results   Component Value Date    TSH 2.890 02/22/2024   Continue to monitor. Weight stable at this time.       7. Positive depression screening  Overview:  I have reviewed the positive depression score which warrants active treatment with psychotherapy and/or medications.    Assessment & Plan:  See above management.       8. Scoliosis of lumbar region due to degenerative disease of spine in adult  Assessment & Plan:  No worsening functional change. Patient maintaining intake daily vitamin D3, mineral nutrients to maintain bone health.   - continue with supplementation  - take daily 4441-3156 IU vitamin D3  - incorporate into diet, vitamin D fortified foods, cereal, yogurt, fresh salmon, canned sardines, tuna and  mushrooms       9. Uncomplicated alcohol dependence  Assessment & Plan:  Reduce consumption, watch for hyponatremia, maintain adequate daily hydration.   - continue to monitor intake          10.  Hypercholesterolemia  Assessment & Plan:  2/22/24 , TG 51, , LDL 63  Blood work reviewed. Findings significant for elevated cholesterol. All others balanced. Continue dietary modifications and incorporate daily physical exercises as tolerated.   - follow heart healthy diet, whole vegetables, whole fruits, whole meats  - avoid processed foods, cut back on highly refined carbohydrates including breads, pasta, tortillas and starchy foods like white potato  - avoid all tobacco products   - maintain regular aerobic exercises        11. Vitamin D insufficiency  Assessment & Plan:  2/22/24 Vitamin D levels within normal limits. Continue with daily dietary intake. Monitor calcium and vitamin D levels in future.           Other than changes above, continue current medications and maintain follow up with specialists.      Follow up in about 6 months (around 9/20/2024) for Med recheck, Annual.   Recent Results (from the past 2016 hour(s))   Comprehensive Metabolic Panel    Collection Time: 01/31/24 11:56 AM   Result Value Ref Range    Sodium 130 (L) 136 - 145 mmol/L    Potassium 3.5 3.5 - 5.1 mmol/L    Chloride 93 (L) 95 - 110 mmol/L    CO2 30 (H) 23 - 29 mmol/L    Glucose 98 70 - 110 mg/dL    BUN 12 8 - 23 mg/dL    Creatinine 0.6 0.5 - 1.4 mg/dL    Calcium 9.6 8.7 - 10.5 mg/dL    Total Protein 8.4 6.0 - 8.4 g/dL    Albumin 4.8 3.5 - 5.2 g/dL    Total Bilirubin 0.8 0.1 - 1.0 mg/dL    Alkaline Phosphatase 56 55 - 135 U/L    AST 18 10 - 40 U/L    ALT 22 10 - 44 U/L    eGFR >60.0 >60 mL/min/1.73 m^2    Anion Gap 7 3 - 11 mmol/L   CBC Auto Differential    Collection Time: 01/31/24 11:56 AM   Result Value Ref Range    WBC 6.51 3.90 - 12.70 K/uL    RBC 4.80 4.00 - 5.40 M/uL    Hemoglobin 15.5 12.0 - 16.0 g/dL    Hematocrit 44.0 37.0 - 48.5 %    MCV 92 82 - 98 fL    MCH 32.3 (H) 27.0 - 31.0 pg    MCHC 35.2 32.0 - 36.0 g/dL    RDW 12.0 11.5 - 14.5 %    Platelets 325 150 - 450 K/uL    MPV 8.8 (L) 9.2 - 12.9 fL    Immature  Granulocytes 0.2 0.0 - 0.5 %    Gran # (ANC) 3.7 1.8 - 7.7 K/uL    Immature Grans (Abs) 0.01 0.00 - 0.04 K/uL    Lymph # 2.0 1.0 - 4.8 K/uL    Mono # 0.8 0.3 - 1.0 K/uL    Eos # 0.0 0.0 - 0.5 K/uL    Baso # 0.05 0.00 - 0.20 K/uL    nRBC 0 0 /100 WBC    Gran % 56.0 38.0 - 73.0 %    Lymph % 30.3 18.0 - 48.0 %    Mono % 12.1 4.0 - 15.0 %    Eosinophil % 0.6 0.0 - 8.0 %    Basophil % 0.8 0.0 - 1.9 %    Differential Method Automated    Lipid Panel    Collection Time: 02/22/24 11:37 AM   Result Value Ref Range    Cholesterol 209 (H) 120 - 199 mg/dL    Triglycerides 51 30 - 150 mg/dL     (H) 40 - 75 mg/dL    LDL Cholesterol 62.8 (L) 63.0 - 159.0 mg/dL    HDL/Cholesterol Ratio 65.1 (H) 20.0 - 50.0 %    Total Cholesterol/HDL Ratio 1.5 (L) 2.0 - 5.0    Non-HDL Cholesterol 73 mg/dL   Hepatitis C Antibody    Collection Time: 02/22/24 11:37 AM   Result Value Ref Range    Hepatitis C Ab Non-reactive Non-reactive   Vitamin D    Collection Time: 02/22/24 11:37 AM   Result Value Ref Range    Vit D, 25-Hydroxy 89 30 - 96 ng/mL   TSH    Collection Time: 02/22/24 11:37 AM   Result Value Ref Range    TSH 2.890 0.400 - 4.000 uIU/mL   Hemoglobin A1C    Collection Time: 02/22/24 11:37 AM   Result Value Ref Range    Hemoglobin A1C 4.8 4.0 - 5.6 %    Estimated Avg Glucose 91 68 - 131 mg/dL   Comprehensive Metabolic Panel    Collection Time: 02/22/24 11:37 AM   Result Value Ref Range    Sodium 136 136 - 145 mmol/L    Potassium 3.9 3.5 - 5.1 mmol/L    Chloride 95 95 - 110 mmol/L    CO2 31 (H) 23 - 29 mmol/L    Glucose 108 70 - 110 mg/dL    BUN 11 8 - 23 mg/dL    Creatinine 0.6 0.5 - 1.4 mg/dL    Calcium 9.7 8.7 - 10.5 mg/dL    Total Protein 8.2 6.0 - 8.4 g/dL    Albumin 4.7 3.5 - 5.2 g/dL    Total Bilirubin 0.6 0.1 - 1.0 mg/dL    Alkaline Phosphatase 48 (L) 55 - 135 U/L    AST 19 10 - 40 U/L    ALT 19 10 - 44 U/L    eGFR >60.0 >60 mL/min/1.73 m^2    Anion Gap 10 3 - 11 mmol/L         Kazumi G Yoshinaga, DO [facility]

## 2024-04-09 ENCOUNTER — PATIENT OUTREACH (OUTPATIENT)
Dept: ADMINISTRATIVE | Facility: HOSPITAL | Age: 64
End: 2024-04-09
Payer: COMMERCIAL

## 2024-05-03 PROBLEM — E78.2 MIXED HYPERLIPIDEMIA: Status: ACTIVE | Noted: 2024-02-22

## 2024-06-05 ENCOUNTER — PATIENT OUTREACH (OUTPATIENT)
Dept: ADMINISTRATIVE | Facility: HOSPITAL | Age: 64
End: 2024-06-05
Payer: COMMERCIAL

## 2024-12-06 ENCOUNTER — PATIENT MESSAGE (OUTPATIENT)
Dept: ADMINISTRATIVE | Facility: HOSPITAL | Age: 64
End: 2024-12-06
Payer: COMMERCIAL

## 2025-01-02 ENCOUNTER — HOSPITAL ENCOUNTER (OUTPATIENT)
Dept: RADIOLOGY | Facility: HOSPITAL | Age: 65
Discharge: HOME | End: 2025-01-02
Attending: NURSE PRACTITIONER
Payer: COMMERCIAL

## 2025-01-02 VITALS — BODY MASS INDEX: 21.71 KG/M2 | HEIGHT: 61 IN | WEIGHT: 115 LBS

## 2025-01-02 DIAGNOSIS — Z12.31 VISIT FOR SCREENING MAMMOGRAM: ICD-10-CM

## 2025-01-02 DIAGNOSIS — M81.0 POSTMENOPAUSAL BONE LOSS: ICD-10-CM

## 2025-01-02 PROCEDURE — 77067 SCR MAMMO BI INCL CAD: CPT | Mod: TC

## 2025-01-02 PROCEDURE — 77091 TBS TECHL CALCULATION ONLY: CPT

## (undated) DEVICE — KIT VIA CUSTOM PROCEDURE

## (undated) DEVICE — SOL IRRI STRL WATER 1000ML

## (undated) DEVICE — TIP YANKAUERS BULB NO VENT

## (undated) DEVICE — KIT BIOGUARD AIR WTR SUC VALVE

## (undated) DEVICE — LINER SUCTION CANNISTER REGUGA

## (undated) DEVICE — UNDERPAD DISPOSABLE 30X30IN

## (undated) DEVICE — SNARE SNAREMASTER OVAL 25MM

## (undated) DEVICE — SPONGE DRY VIA GREEN

## (undated) DEVICE — TUBE SUC UNIVERSAL .25XIN 6FT

## (undated) DEVICE — GOWN SURGICAL BRTHBL XL

## (undated) DEVICE — TUBING OXYGEN CONNECT BUBBLE

## (undated) DEVICE — ELECTRODE FOAM 535 TEARDROP

## (undated) DEVICE — TRAP ETRAP POLYP 50 TRAY

## (undated) DEVICE — ELECTRODE REM PLYHSV RETURN 9